# Patient Record
Sex: FEMALE | Race: WHITE | NOT HISPANIC OR LATINO | Employment: FULL TIME | URBAN - NONMETROPOLITAN AREA
[De-identification: names, ages, dates, MRNs, and addresses within clinical notes are randomized per-mention and may not be internally consistent; named-entity substitution may affect disease eponyms.]

---

## 2017-02-10 RX ORDER — LIDOCAINE 5 G/100G
CREAM RECTAL; TOPICAL
Qty: 1 TUBE | Refills: 0 | Status: SHIPPED | OUTPATIENT
Start: 2017-02-10 | End: 2017-09-28

## 2017-02-10 RX ORDER — LIDOCAINE 5 G/100G
CREAM RECTAL; TOPICAL
Refills: 0 | OUTPATIENT
Start: 2017-02-10

## 2017-09-28 ENCOUNTER — OFFICE VISIT (OUTPATIENT)
Dept: MEDICAL GROUP | Facility: CLINIC | Age: 47
End: 2017-09-28
Payer: COMMERCIAL

## 2017-09-28 ENCOUNTER — HOSPITAL ENCOUNTER (OUTPATIENT)
Facility: MEDICAL CENTER | Age: 47
End: 2017-09-28
Attending: PHYSICIAN ASSISTANT
Payer: COMMERCIAL

## 2017-09-28 VITALS
HEART RATE: 72 BPM | OXYGEN SATURATION: 98 % | SYSTOLIC BLOOD PRESSURE: 98 MMHG | RESPIRATION RATE: 14 BRPM | TEMPERATURE: 99.2 F | HEIGHT: 63 IN | DIASTOLIC BLOOD PRESSURE: 62 MMHG | BODY MASS INDEX: 20.38 KG/M2 | WEIGHT: 115 LBS

## 2017-09-28 DIAGNOSIS — Z13.6 SCREENING FOR CARDIOVASCULAR CONDITION: ICD-10-CM

## 2017-09-28 DIAGNOSIS — Z12.31 VISIT FOR SCREENING MAMMOGRAM: ICD-10-CM

## 2017-09-28 DIAGNOSIS — Z00.00 WELL ADULT EXAM: ICD-10-CM

## 2017-09-28 DIAGNOSIS — G89.29 CHRONIC UPPER BACK PAIN: ICD-10-CM

## 2017-09-28 DIAGNOSIS — E03.9 HYPOTHYROIDISM, UNSPECIFIED TYPE: ICD-10-CM

## 2017-09-28 DIAGNOSIS — M54.9 CHRONIC UPPER BACK PAIN: ICD-10-CM

## 2017-09-28 LAB — GLUCOSE BLD-MCNC: 70 MG/DL (ref 70–100)

## 2017-09-28 PROCEDURE — 82962 GLUCOSE BLOOD TEST: CPT | Performed by: PHYSICIAN ASSISTANT

## 2017-09-28 PROCEDURE — 99214 OFFICE O/P EST MOD 30 MIN: CPT | Performed by: PHYSICIAN ASSISTANT

## 2017-09-28 PROCEDURE — 80061 LIPID PANEL: CPT

## 2017-09-28 RX ORDER — LEVOTHYROXINE SODIUM 0.05 MG/1
50 TABLET ORAL
Qty: 30 TAB | Refills: 2 | Status: SHIPPED | OUTPATIENT
Start: 2017-09-28 | End: 2017-09-29 | Stop reason: SDUPTHER

## 2017-09-28 RX ORDER — MELOXICAM 7.5 MG/1
7.5 TABLET ORAL DAILY
Qty: 30 TAB | Refills: 0 | Status: SHIPPED | OUTPATIENT
Start: 2017-09-28 | End: 2017-11-09

## 2017-09-28 ASSESSMENT — PATIENT HEALTH QUESTIONNAIRE - PHQ9: CLINICAL INTERPRETATION OF PHQ2 SCORE: 0

## 2017-09-28 NOTE — ASSESSMENT & PLAN NOTE
Patient states this has been present for months and is not getting better. She has tried rubbing icy hot, bengay and other topical solutions without relief. She states tylenol and ibuprofen give her relief for a few hours but the pain returns. She has not tried massage and she states she stretches frequently at work. She has no numbness or tingling in her arms.

## 2017-09-28 NOTE — PROGRESS NOTES
Chief Complaint   Patient presents with   • Establish Care       HISTORY OF PRESENT ILLNESS: Patient is a 47 y.o. female established patient who presents today for evaluation and management of:    Chronic upper back pain  Patient states this has been present for months and is not getting better. She has tried rubbing icy hot, bengay and other topical solutions without relief. She states tylenol and ibuprofen give her relief for a few hours but the pain returns. She has not tried massage and she states she stretches frequently at work. She has no numbness or tingling in her arms.     Hypothyroid  Patient states she started taking the levothyroxine that was prescribed and it gave her nausea after a wek so she stopped and never had it rechecked. She states she wasn't sure why she was taking it to begin with. She does feel lethargic and her skin is quite dry but she does not feel poorly otherwise.     Well adult exam  Patient presents for a health screening for her work feeling well today with the exceptions listed.        Patient Active Problem List    Diagnosis Date Noted   • Hypothyroid 11/06/2016     Priority: High   • Weight loss 11/03/2016     Priority: High   • Tooth abscess 11/03/2016     Priority: High   • Left sided sciatica 03/03/2011     Priority: Medium   • Contact dermatitis 09/02/2016     Priority: Low   • Eczema 05/22/2015     Priority: Low   • Chronic upper back pain 09/28/2017   • Well adult exam 09/28/2017       Allergies:Review of patient's allergies indicates no known allergies.    Current Outpatient Prescriptions   Medication Sig Dispense Refill   • levothyroxine (SYNTHROID) 50 MCG Tab Take 1 Tab by mouth Every morning on an empty stomach. 30 Tab 2   • meloxicam (MOBIC) 7.5 MG Tab Take 1 Tab by mouth every day. 30 Tab 0     No current facility-administered medications for this visit.        Social History   Substance Use Topics   • Smoking status: Former Smoker     Packs/day: 0.50     Years: 26.00  "    Types: Cigarettes     Quit date: 2009   • Smokeless tobacco: Never Used   • Alcohol use No       Family Status   Relation Status   • Mother  at age 57    emphysema  and cirrhosis   • Father     62 in    • Brother  at age 47    complications from diabetes     Family History   Problem Relation Age of Onset   • Lung Disease Mother    • GI Mother    • Non-contributory Father      health history is unknown   • Diabetes Brother        Review of Systems:   Constitutional: Negative for fever, chills, weight loss and malaise.   HENT: Negative for ear pain, nosebleeds, congestion, sore throat and neck pain.    Eyes: Negative for blurred vision.   Respiratory: Negative for cough, sputum production, shortness of breath and wheezing.    Cardiovascular: Negative for chest pain, palpitations, orthopnea and leg swelling.   Gastrointestinal: Negative for heartburn, nausea, vomiting and abdominal pain.   Genitourinary: Negative for dysuria, urgency and frequency.   Musculoskeletal: See history of present illness above. Negative for joint pain.   Skin: Negative for rash and itching.   Neurological: Negative for dizziness, tingling, tremors, sensory change, focal weakness and headaches.   Endo/Heme/Allergies: Does not bruise/bleed easily.   Psychiatric/Behavioral: Negative for depression, suicidal ideas and memory loss.  The patient is not nervous/anxious and does not have insomnia.      Exam:  Blood pressure (!) 98/62, pulse 72, temperature 37.3 °C (99.2 °F), resp. rate 14, height 1.607 m (5' 3.25\"), weight 52.2 kg (115 lb), SpO2 98 %.  Body mass index is 20.21 kg/m².  General:  Healthy-Appearing female in NAD  Head: is grossly normal.  Neck: Supple without masses. Thyroid is not visibly enlarged.  Pulmonary: Clear to ausculation. Normal effort. No rales, ronchi, or wheezing.  Cardiovascular: Regular rate and rhythm without murmur. Carotid and radial pulses are intact and equal " bilaterally.  Extremities: no clubbing, cyanosis, or edema.    Medical decision-making and discussion:  1. Hypothyroidism, unspecified type  Patient was advised to take her levothyroxine one half tablet for one week followed by one full tablet thereafter. She is advised to take this medication on an empty stomach about 30-40 minutes before any other pills or food. She is advised that it may take 4-6 weeks for this medication to begin having an effect and she should try for at least that long before dismissing its positive effects.  - levothyroxine (SYNTHROID) 50 MCG Tab; Take 1 Tab by mouth Every morning on an empty stomach.  Dispense: 30 Tab; Refill: 2  - TSH+FREE T4    2. Well adult exam  Patient's waist circumference is 28 inches today. She has a healthy BMI and her blood glucose is within normal limits.  - POCT Glucose  - LIPID PANEL    3. Screening for cardiovascular condition  - POCT Glucose  - LIPID PANEL    4. Chronic upper back pain  Patient was advised to continue using hot packs and massage as needed. She is advised to take the following medication always with food. She can take one pill every morning and if this doesn't work after 3-4 days, she can take 2 pills every morning. If this is still not providing relief after 3-4 days, she is advised to stop taking this medication and return for follow-up visit.  - meloxicam (MOBIC) 7.5 MG Tab; Take 1 Tab by mouth every day.  Dispense: 30 Tab; Refill: 0    5. Visit for screening mammogram  - MA-SCREEN MAMMO W/CAD-BILAT  Patient states she has not had a mammogram in approximately 2 years.    Regarding health maintenance: Patient is refusing all vaccinations today including tetanus shot and influenza.    Please note that this dictation was created using voice recognition software. I have made every reasonable attempt to correct obvious errors, but I expect that there are errors of grammar and possibly content that I did not discover before finalizing the  note.      Return for female annual and 6 weeks follow up thyroid.

## 2017-09-28 NOTE — ASSESSMENT & PLAN NOTE
Patient presents for a health screening for her work feeling well today with the exceptions listed.

## 2017-09-28 NOTE — ASSESSMENT & PLAN NOTE
Patient states she started taking the levothyroxine that was prescribed and it gave her nausea after a wek so she stopped and never had it rechecked. She states she wasn't sure why she was taking it to begin with. She does feel lethargic and her skin is quite dry but she does not feel poorly otherwise.

## 2017-09-29 DIAGNOSIS — E03.9 HYPOTHYROIDISM, UNSPECIFIED TYPE: ICD-10-CM

## 2017-09-29 LAB
CHOLEST SERPL-MCNC: 190 MG/DL (ref 100–199)
HDLC SERPL-MCNC: 73 MG/DL
LDLC SERPL CALC-MCNC: 104 MG/DL
TRIGL SERPL-MCNC: 65 MG/DL (ref 0–149)

## 2017-09-29 NOTE — TELEPHONE ENCOUNTER
This was sent to the wrong pharm - please authorize it to go to [facility]    Was the patient seen in the last year in this department? Yes     Does patient have an active prescription for medications requested? No     Received Request Via: Patient

## 2017-10-02 RX ORDER — LEVOTHYROXINE SODIUM 0.05 MG/1
50 TABLET ORAL
Qty: 30 TAB | Refills: 0 | Status: SHIPPED | OUTPATIENT
Start: 2017-10-02 | End: 2018-04-05 | Stop reason: SDUPTHER

## 2017-10-02 NOTE — TELEPHONE ENCOUNTER
Patient's thyroid labs were not drawn when her other labs were drawn. Please have her return for this in order to receive more refills as her thyroid levels were not wnl at last check in Now 2016.

## 2017-10-20 ENCOUNTER — TELEPHONE (OUTPATIENT)
Dept: MEDICAL GROUP | Facility: CLINIC | Age: 47
End: 2017-10-20

## 2017-10-20 NOTE — TELEPHONE ENCOUNTER
----- Message from Rachna Shannon P.A.-C. sent at 10/2/2017  1:57 PM PDT -----  I reviewed labs. Everything looks good. Please return to have Thyroid labs drawn. Apologies for not having drawn this at the last lab draw.

## 2017-11-09 ENCOUNTER — OFFICE VISIT (OUTPATIENT)
Dept: MEDICAL GROUP | Facility: CLINIC | Age: 47
End: 2017-11-09
Payer: COMMERCIAL

## 2017-11-09 VITALS
DIASTOLIC BLOOD PRESSURE: 72 MMHG | TEMPERATURE: 97.3 F | HEIGHT: 63 IN | SYSTOLIC BLOOD PRESSURE: 114 MMHG | WEIGHT: 115 LBS | BODY MASS INDEX: 20.38 KG/M2 | OXYGEN SATURATION: 96 % | HEART RATE: 60 BPM

## 2017-11-09 DIAGNOSIS — Z23 NEED FOR VACCINATION: ICD-10-CM

## 2017-11-09 DIAGNOSIS — G89.29 CHRONIC UPPER BACK PAIN: ICD-10-CM

## 2017-11-09 DIAGNOSIS — M54.9 CHRONIC UPPER BACK PAIN: ICD-10-CM

## 2017-11-09 PROBLEM — Z00.00 WELL ADULT EXAM: Status: RESOLVED | Noted: 2017-09-28 | Resolved: 2017-11-09

## 2017-11-09 PROCEDURE — 99212 OFFICE O/P EST SF 10 MIN: CPT | Mod: 25 | Performed by: PHYSICIAN ASSISTANT

## 2017-11-09 PROCEDURE — 90471 IMMUNIZATION ADMIN: CPT | Performed by: PHYSICIAN ASSISTANT

## 2017-11-09 PROCEDURE — 90686 IIV4 VACC NO PRSV 0.5 ML IM: CPT | Performed by: PHYSICIAN ASSISTANT

## 2017-11-09 RX ORDER — MELOXICAM 15 MG/1
15 TABLET ORAL DAILY
Qty: 30 TAB | Refills: 2 | Status: SHIPPED | OUTPATIENT
Start: 2017-11-09 | End: 2018-07-05

## 2017-11-09 NOTE — ASSESSMENT & PLAN NOTE
Patient states this has been present for months and is getting better with use of once daily 7.5mg meloxicam although is still problematic. She has tried rubbing icy hot, bengay and other topical solutions with mild relief. She states tylenol and ibuprofen give her relief for a few hours but the pain returns. She has not tried massage and she states she stretches frequently at work. She has no numbness or tingling in her arms.

## 2017-11-09 NOTE — PROGRESS NOTES
Chief Complaint   Patient presents with   • Back Pain     medication not working   • Orders Needed     mammo       HISTORY OF PRESENT ILLNESS: Patient is a 47 y.o. female established patient who presents today for evaluation and management of:    Chronic upper back pain  Patient states this has been present for months and is getting better with use of once daily 7.5mg meloxicam although is still problematic. She has tried rubbing icy hot, bengay and other topical solutions with mild relief. She states tylenol and ibuprofen give her relief for a few hours but the pain returns. She has not tried massage and she states she stretches frequently at work. She has no numbness or tingling in her arms.         Patient Active Problem List    Diagnosis Date Noted   • Hypothyroid 2016     Priority: High   • Left sided sciatica 2011     Priority: Medium   • Eczema 2015     Priority: Low   • Chronic upper back pain 2017       Allergies:Review of patient's allergies indicates no known allergies.    Current Outpatient Prescriptions   Medication Sig Dispense Refill   • meloxicam (MOBIC) 15 MG tablet Take 1 Tab by mouth every day. 30 Tab 2   • levothyroxine (SYNTHROID) 50 MCG Tab Take 1 Tab by mouth Every morning on an empty stomach. 30 Tab 0     No current facility-administered medications for this visit.        Social History   Substance Use Topics   • Smoking status: Former Smoker     Packs/day: 0.50     Years: 26.00     Types: Cigarettes     Quit date: 2009   • Smokeless tobacco: Never Used   • Alcohol use No       Family Status   Relation Status   • Mother  at age 57    emphysema  and cirrhosis   • Father     62 in    • Brother  at age 47    complications from diabetes     Family History   Problem Relation Age of Onset   • Lung Disease Mother    • GI Mother    • Non-contributory Father      health history is unknown   • Diabetes Brother        Review of Systems:  "  Constitutional: Negative for fever, chills, weight loss and malaise/fatigue.   Respiratory: Negative for cough, shortness of breath .    Cardiovascular: Negative for chest pain   Gastrointestinal: Negative for heartburn and abdominal pain.   Genitourinary: Negative for dysuria, urgency and frequency.   Musculoskeletal: Positive for myalgias, back pain.   Neurological: Positive for some lightheadedness with meloxicam use, changed to night dosing and feels better. Negative for dizziness and headaches.   Endo/Heme/Allergies: Does not bruise/bleed easily.     Exam:  Blood pressure 114/72, pulse 60, temperature 36.3 °C (97.3 °F), height 1.6 m (5' 3\"), weight 52.2 kg (115 lb), SpO2 96 %.  Body mass index is 20.37 kg/m².  General:  Healthy-Appearing female in NAD  Head: is grossly normal.  Neck: Supple without masses. Thyroid is not visibly enlarged.  Pulmonary:  Normal effort.   Cardiovascular: Radial pulses are intact and equal bilaterally.  Extremities: no clubbing, cyanosis, or edema.    Medical decision-making and discussion:  1. Chronic upper back pain  Advised that if the increased dose of meloxicam does not alleviate her neck pain to a tolerable level, we will try voltaren tablets as our next step and potentially moxe on to celecoxib if needed.   - meloxicam (MOBIC) 15 MG tablet; Take 1 Tab by mouth every day.  Dispense: 30 Tab; Refill: 2    2. Need for vaccination    - Flu Quad Inj >3 Year Pre-Filled PF      Please note that this dictation was created using voice recognition software. I have made every reasonable attempt to correct obvious errors, but I expect that there are errors of grammar and possibly content that I did not discover before finalizing the note.      Return if symptoms worsen or fail to improve.  "

## 2017-11-16 ENCOUNTER — NON-PROVIDER VISIT (OUTPATIENT)
Dept: MEDICAL GROUP | Facility: CLINIC | Age: 47
End: 2017-11-16
Payer: COMMERCIAL

## 2017-11-16 ENCOUNTER — HOSPITAL ENCOUNTER (OUTPATIENT)
Facility: MEDICAL CENTER | Age: 47
End: 2017-11-16
Attending: PHYSICIAN ASSISTANT

## 2017-11-16 DIAGNOSIS — Z01.89 ROUTINE LAB DRAW: ICD-10-CM

## 2017-11-16 PROCEDURE — 99000 SPECIMEN HANDLING OFFICE-LAB: CPT | Performed by: PHYSICIAN ASSISTANT

## 2017-11-16 PROCEDURE — 36415 COLL VENOUS BLD VENIPUNCTURE: CPT | Performed by: PHYSICIAN ASSISTANT

## 2017-11-16 PROCEDURE — 84439 ASSAY OF FREE THYROXINE: CPT

## 2017-11-16 PROCEDURE — 84443 ASSAY THYROID STIM HORMONE: CPT

## 2017-11-17 LAB
T4 FREE SERPL-MCNC: 0.61 NG/DL (ref 0.53–1.43)
TSH SERPL DL<=0.005 MIU/L-ACNC: 42.05 UIU/ML (ref 0.3–3.7)

## 2017-11-20 DIAGNOSIS — E03.9 HYPOTHYROIDISM, UNSPECIFIED TYPE: ICD-10-CM

## 2017-11-20 NOTE — PROGRESS NOTES
I reviewed thyroid labs. I would like to have the patient return for follow up to discuss in mid December after she has repeat labs for this to see if her levothyroxine is working. New lab orders are in her chart to have this completed.

## 2017-11-25 ENCOUNTER — TELEPHONE (OUTPATIENT)
Dept: MEDICAL GROUP | Facility: CLINIC | Age: 47
End: 2017-11-25

## 2018-04-05 ENCOUNTER — OFFICE VISIT (OUTPATIENT)
Dept: MEDICAL GROUP | Facility: PHYSICIAN GROUP | Age: 48
End: 2018-04-05
Payer: COMMERCIAL

## 2018-04-05 VITALS
RESPIRATION RATE: 16 BRPM | TEMPERATURE: 97.8 F | OXYGEN SATURATION: 98 % | SYSTOLIC BLOOD PRESSURE: 98 MMHG | HEIGHT: 63 IN | BODY MASS INDEX: 20.38 KG/M2 | DIASTOLIC BLOOD PRESSURE: 72 MMHG | WEIGHT: 115 LBS | HEART RATE: 74 BPM

## 2018-04-05 DIAGNOSIS — M25.531 PAIN IN BOTH WRISTS: ICD-10-CM

## 2018-04-05 DIAGNOSIS — M79.601 PAIN IN BOTH UPPER EXTREMITIES: ICD-10-CM

## 2018-04-05 DIAGNOSIS — M54.9 CHRONIC UPPER BACK PAIN: ICD-10-CM

## 2018-04-05 DIAGNOSIS — E03.9 HYPOTHYROIDISM, UNSPECIFIED TYPE: ICD-10-CM

## 2018-04-05 DIAGNOSIS — M25.532 PAIN IN BOTH WRISTS: ICD-10-CM

## 2018-04-05 DIAGNOSIS — Z13.6 SCREENING FOR CARDIOVASCULAR CONDITION: ICD-10-CM

## 2018-04-05 DIAGNOSIS — G89.29 CHRONIC UPPER BACK PAIN: ICD-10-CM

## 2018-04-05 DIAGNOSIS — M79.602 PAIN IN BOTH UPPER EXTREMITIES: ICD-10-CM

## 2018-04-05 PROCEDURE — 99214 OFFICE O/P EST MOD 30 MIN: CPT | Performed by: NURSE PRACTITIONER

## 2018-04-05 RX ORDER — LEVOTHYROXINE SODIUM 0.05 MG/1
50 TABLET ORAL
Qty: 90 TAB | Refills: 0 | Status: SHIPPED | OUTPATIENT
Start: 2018-04-05 | End: 2018-04-09 | Stop reason: SDUPTHER

## 2018-04-05 RX ORDER — CELECOXIB 200 MG/1
200 CAPSULE ORAL 2 TIMES DAILY
Qty: 180 CAP | Refills: 1 | Status: SHIPPED | OUTPATIENT
Start: 2018-04-05 | End: 2018-07-05

## 2018-04-05 NOTE — PROGRESS NOTES
Chief Complaint   Patient presents with   • Hypothyroidism     est care, refill levothyroxine         This is a 47 y.o.female patient that presents today with the following: med refill    Chronic upper back pain  Pt has chronic pain in her upper back, shoulders, and bilateral upper extremities. This has been going on for the last year. She feels the pain mostly in the shoulders that radiates into her fingers. This is an aching pain, but has numbness in her arms and hands during the night. She has never been diagnosed with carpal tunnel syndrome.   She was taking Meloxicam 15 mg daily, but did not work at all. She did have positive Tinel's and Phalen's in office. She has not tried braces at night. Will get neurodiagnostics, and depending on results, may need referral to hand surgeon. In the interim, she was advised to wear braces at night, continue with NSAIDS.    Hypothyroid  Patient reports she has been taking levothyroxine 50 µg for her hypothyroidism, but she has been out and needs a refill. The last time she had labs her TSH was over 40. We'll like her to repeat these labs, these were ordered. Her medications have been refilled, but I told her we will likely be changing this dose. She does tolerate medications well with no significant bothersome side effects. She does understand to take this on an empty stomach first thing in the morning apart from her other medications.    Pain in both wrists  See additional notes on chronic upper back pain. She also has chronic upper extremity and wrist pain. She did have positive Tinel's and Phalen test and office today. We'll get neurodiagnostic including EMG and nerve conduction study to test for carpal tunnel syndrome. She was given bilateral cough up wrist splints to wear at night. We'll have her stop meloxicam and start Celebrex 200 mg twice daily. She was advised to take this with food. Also advised her to try vitamin B6 for numbness and tingling. She is been a  "follow-up with me sometime after her nerve conduction study. I did discuss her she may need referral to hand surgeon depending on the results.      No visits with results within 1 Month(s) from this visit.   Latest known visit with results is:   Hospital Outpatient Visit on 11/16/2017   Component Date Value   • TSH 11/16/2017 42.050*   • Free T-4 11/16/2017 0.61          clinical course has been stable    Past Medical History:   Diagnosis Date   • Left sided sciatica 3/3/2011   • Urinary frequency 8/20/2010       Past Surgical History:   Procedure Laterality Date   • TUBAL LIGATION         Family History   Problem Relation Age of Onset   • Lung Disease Mother    • GI Mother    • Non-contributory Father      health history is unknown   • Diabetes Brother        Patient has no known allergies.    Current Outpatient Prescriptions Ordered in [facility]   Medication Sig Dispense Refill   • levothyroxine (SYNTHROID) 50 MCG Tab Take 1 Tab by mouth Every morning on an empty stomach. 90 Tab 0   • celecoxib (CELEBREX) 200 MG Cap Take 1 Cap by mouth 2 times a day. 180 Cap 1   • meloxicam (MOBIC) 15 MG tablet Take 1 Tab by mouth every day. 30 Tab 2     No current Epic-ordered facility-administered medications on file.        Constitutional ROS: No unexpected change in weight, No weakness, No unexplained fevers, sweats, or chills  Pulmonary ROS: No chronic cough, sputum, or hemoptysis, No shortness of breath, No recent change in breathing  Cardiovascular ROS: No chest pain, No edema, No palpitations  Gastrointestinal ROS: No abdominal pain, No nausea, vomiting, diarrhea, or constipation  Musculoskeletal/Extremities ROS: positive per HPI  Neurologic ROS: Normal development, No seizures, No weakness, Positive for numbness and tingling to BUE  Endocrine ROS: positive per HPI    Physical exam:  BP (!) 98/72   Pulse 74   Temp 36.6 °C (97.8 °F)   Resp 16   Ht 1.6 m (5' 3\")   Wt 52.2 kg (115 lb)   SpO2 98%   BMI 20.37 kg/m²   General " Appearance: thin, middle-aged female, alert, no distress, well nourished, well-groomed  Skin: Skin color, texture, turgor normal. No rashes or lesions.  Eyes: conjunctivae/corneas clear. PERRL, EOM's intact. Fundi benign  Lungs: negative findings: normal respiratory rate and rhythm, lungs clear to auscultation  Heart: negative. RRR without murmur, gallop, or rubs.  No ectopy.  Abdomen: Abdomen soft, non-tender. BS normal. No masses,  No organomegaly  Extremities: positive findings: +Tinel's and Phalen's test  Musculoskeletal: negative findings: strength normal, no evidence of muscle atrophy, no deformities present  Neurologic: intact, oriented, mood appropriate, judgement intact, cranial nerves 2-12 grossly intact.    Medical decision making/discussion: Patient is going to stop meloxicam and start Celebrex. She was given bilateral wrist splints to wear at night. We will get neurodiagnostic studies to assess for carpal tunnel due to positive Tinel's and Phalen test in office today. She is going to have labs done sometime tomorrow or Saturday. She is to follow-up with me in 3 months, sooner if needed. Levothyroxine has been refilled, however told her we will likely have to change the dose as her last TSH was elevated and she did not follow-up for a repeat TSH.    Bertha was seen today for hypothyroidism.    Diagnoses and all orders for this visit:    Hypothyroidism, unspecified type  -     TSH WITH REFLEX TO FT4; Future  -     levothyroxine (SYNTHROID) 50 MCG Tab; Take 1 Tab by mouth Every morning on an empty stomach.    Chronic upper back pain    Pain in both upper extremities  -     celecoxib (CELEBREX) 200 MG Cap; Take 1 Cap by mouth 2 times a day.  -     REFERRAL TO NEURODIAGNOSTICS (EEG,EP,EMG/NCS/DBS) Modality Requested: EMG/NCS-Comment Extremities    Pain in both wrists  -     celecoxib (CELEBREX) 200 MG Cap; Take 1 Cap by mouth 2 times a day.  -     REFERRAL TO NEURODIAGNOSTICS (EEG,EP,EMG/NCS/DBS) Modality  Requested: EMG/NCS-Comment Extremities    Screening for cardiovascular condition  -     COMP METABOLIC PANEL; Future  -     LIPID PROFILE; Future          Please note that this dictation was created using voice recognition software. I have made every reasonable attempt to correct obvious errors, but I expect that there are errors of grammar and possibly content that I did not discover before finalizing the note.

## 2018-04-05 NOTE — ASSESSMENT & PLAN NOTE
See additional notes on chronic upper back pain. She also has chronic upper extremity and wrist pain. She did have positive Tinel's and Phalen test and office today. We'll get neurodiagnostic including EMG and nerve conduction study to test for carpal tunnel syndrome. She was given bilateral cough up wrist splints to wear at night. We'll have her stop meloxicam and start Celebrex 200 mg twice daily. She was advised to take this with food. Also advised her to try vitamin B6 for numbness and tingling. She is been a follow-up with me sometime after her nerve conduction study. I did discuss her she may need referral to hand surgeon depending on the results.

## 2018-04-05 NOTE — PATIENT INSTRUCTIONS
Labs tomorrow or Saturday    Levothyroxine called in    Stop the Meloxicam and  Start Celebrex, also try vitamin B6    Wrist braces at night    Neuro diagnostic studies

## 2018-04-05 NOTE — ASSESSMENT & PLAN NOTE
Patient reports she has been taking levothyroxine 50 µg for her hypothyroidism, but she has been out and needs a refill. The last time she had labs her TSH was over 40. We'll like her to repeat these labs, these were ordered. Her medications have been refilled, but I told her we will likely be changing this dose. She does tolerate medications well with no significant bothersome side effects. She does understand to take this on an empty stomach first thing in the morning apart from her other medications.

## 2018-04-05 NOTE — ASSESSMENT & PLAN NOTE
Pt has chronic pain in her upper back, shoulders, and bilateral upper extremities. This has been going on for the last year. She feels the pain mostly in the shoulders that radiates into her fingers. This is an aching pain, but has numbness in her arms and hands during the night. She has never been diagnosed with carpal tunnel syndrome.   She was taking Meloxicam 15 mg daily, but did not work at all. She did have positive Tinel's and Phalen's in office. She has not tried braces at night. Will get neurodiagnostics, and depending on results, may need referral to hand surgeon. In the interim, she was advised to wear braces at night, continue with NSAIDS.

## 2018-04-07 ENCOUNTER — HOSPITAL ENCOUNTER (OUTPATIENT)
Dept: LAB | Facility: MEDICAL CENTER | Age: 48
End: 2018-04-07
Attending: NURSE PRACTITIONER
Payer: COMMERCIAL

## 2018-04-07 DIAGNOSIS — Z13.6 SCREENING FOR CARDIOVASCULAR CONDITION: ICD-10-CM

## 2018-04-07 DIAGNOSIS — E03.9 HYPOTHYROIDISM, UNSPECIFIED TYPE: ICD-10-CM

## 2018-04-07 LAB
ALBUMIN SERPL BCP-MCNC: 4.2 G/DL (ref 3.2–4.9)
ALBUMIN/GLOB SERPL: 1.6 G/DL
ALP SERPL-CCNC: 27 U/L (ref 30–99)
ALT SERPL-CCNC: 14 U/L (ref 2–50)
ANION GAP SERPL CALC-SCNC: 3 MMOL/L (ref 0–11.9)
AST SERPL-CCNC: 18 U/L (ref 12–45)
BILIRUB SERPL-MCNC: 2.1 MG/DL (ref 0.1–1.5)
BUN SERPL-MCNC: 11 MG/DL (ref 8–22)
CALCIUM SERPL-MCNC: 9.3 MG/DL (ref 8.5–10.5)
CHLORIDE SERPL-SCNC: 106 MMOL/L (ref 96–112)
CHOLEST SERPL-MCNC: 161 MG/DL (ref 100–199)
CO2 SERPL-SCNC: 30 MMOL/L (ref 20–33)
CREAT SERPL-MCNC: 0.5 MG/DL (ref 0.5–1.4)
GLOBULIN SER CALC-MCNC: 2.7 G/DL (ref 1.9–3.5)
GLUCOSE SERPL-MCNC: 79 MG/DL (ref 65–99)
HDLC SERPL-MCNC: 64 MG/DL
LDLC SERPL CALC-MCNC: 83 MG/DL
POTASSIUM SERPL-SCNC: 4.1 MMOL/L (ref 3.6–5.5)
PROT SERPL-MCNC: 6.9 G/DL (ref 6–8.2)
SODIUM SERPL-SCNC: 139 MMOL/L (ref 135–145)
T4 FREE SERPL-MCNC: 0.44 NG/DL (ref 0.53–1.43)
TRIGL SERPL-MCNC: 70 MG/DL (ref 0–149)
TSH SERPL DL<=0.005 MIU/L-ACNC: 37.66 UIU/ML (ref 0.38–5.33)

## 2018-04-07 PROCEDURE — 84443 ASSAY THYROID STIM HORMONE: CPT

## 2018-04-07 PROCEDURE — 80053 COMPREHEN METABOLIC PANEL: CPT

## 2018-04-07 PROCEDURE — 36415 COLL VENOUS BLD VENIPUNCTURE: CPT

## 2018-04-07 PROCEDURE — 80061 LIPID PANEL: CPT

## 2018-04-07 PROCEDURE — 84439 ASSAY OF FREE THYROXINE: CPT

## 2018-04-09 DIAGNOSIS — E03.9 HYPOTHYROIDISM, UNSPECIFIED TYPE: ICD-10-CM

## 2018-04-09 RX ORDER — LEVOTHYROXINE SODIUM 0.07 MG/1
75 TABLET ORAL
Qty: 90 TAB | Refills: 1 | Status: SHIPPED | OUTPATIENT
Start: 2018-04-09 | End: 2018-07-05 | Stop reason: SDUPTHER

## 2018-06-20 ENCOUNTER — HOSPITAL ENCOUNTER (OUTPATIENT)
Dept: LAB | Facility: MEDICAL CENTER | Age: 48
End: 2018-06-20
Attending: NURSE PRACTITIONER
Payer: COMMERCIAL

## 2018-06-20 DIAGNOSIS — E03.9 HYPOTHYROIDISM, UNSPECIFIED TYPE: ICD-10-CM

## 2018-06-20 LAB
T4 FREE SERPL-MCNC: 0.77 NG/DL (ref 0.53–1.43)
TSH SERPL DL<=0.005 MIU/L-ACNC: 12.31 UIU/ML (ref 0.38–5.33)

## 2018-06-20 PROCEDURE — 84439 ASSAY OF FREE THYROXINE: CPT

## 2018-06-20 PROCEDURE — 84443 ASSAY THYROID STIM HORMONE: CPT

## 2018-06-20 PROCEDURE — 36415 COLL VENOUS BLD VENIPUNCTURE: CPT

## 2018-07-05 ENCOUNTER — OFFICE VISIT (OUTPATIENT)
Dept: MEDICAL GROUP | Facility: PHYSICIAN GROUP | Age: 48
End: 2018-07-05
Payer: COMMERCIAL

## 2018-07-05 VITALS
HEIGHT: 63 IN | WEIGHT: 111 LBS | HEART RATE: 76 BPM | RESPIRATION RATE: 12 BRPM | OXYGEN SATURATION: 99 % | TEMPERATURE: 98.5 F | BODY MASS INDEX: 19.67 KG/M2 | DIASTOLIC BLOOD PRESSURE: 72 MMHG | SYSTOLIC BLOOD PRESSURE: 100 MMHG

## 2018-07-05 DIAGNOSIS — M54.9 CHRONIC UPPER BACK PAIN: ICD-10-CM

## 2018-07-05 DIAGNOSIS — M79.602 PAIN IN BOTH UPPER EXTREMITIES: ICD-10-CM

## 2018-07-05 DIAGNOSIS — E03.9 HYPOTHYROIDISM, UNSPECIFIED TYPE: ICD-10-CM

## 2018-07-05 DIAGNOSIS — G89.29 CHRONIC UPPER BACK PAIN: ICD-10-CM

## 2018-07-05 DIAGNOSIS — M79.601 PAIN IN BOTH UPPER EXTREMITIES: ICD-10-CM

## 2018-07-05 DIAGNOSIS — M25.531 PAIN IN BOTH WRISTS: ICD-10-CM

## 2018-07-05 DIAGNOSIS — M25.532 PAIN IN BOTH WRISTS: ICD-10-CM

## 2018-07-05 DIAGNOSIS — F41.1 PRE-OPERATIVE ANXIETY: ICD-10-CM

## 2018-07-05 PROCEDURE — 99214 OFFICE O/P EST MOD 30 MIN: CPT | Performed by: NURSE PRACTITIONER

## 2018-07-05 RX ORDER — DIAZEPAM 5 MG/1
5 TABLET ORAL
Qty: 2 TAB | Refills: 0 | Status: SHIPPED | OUTPATIENT
Start: 2018-07-31 | End: 2018-08-01

## 2018-07-05 RX ORDER — LEVOTHYROXINE SODIUM 0.1 MG/1
100 TABLET ORAL
Qty: 90 TAB | Refills: 1 | Status: SHIPPED | OUTPATIENT
Start: 2018-07-05 | End: 2018-11-20 | Stop reason: SDUPTHER

## 2018-07-05 RX ORDER — TIZANIDINE 4 MG/1
4 TABLET ORAL 3 TIMES DAILY
Qty: 30 TAB | Refills: 1 | Status: SHIPPED | OUTPATIENT
Start: 2018-07-05 | End: 2018-11-20 | Stop reason: SDUPTHER

## 2018-07-05 NOTE — PROGRESS NOTES
Chief Complaint   Patient presents with   • Labs Only     follow up   • Hypothyroidism         This is a 47 y.o.female patient that presents today with the following: Follow-up visit, review labsclinical course has been stable    Chronic upper back pain  Patient continues to have pain in her upper back, shoulders and bilateral upper extremities.  At her last visit with me in early April she was referred to have neurodiagnostic studies, but she did not hear from the radiology department to set this up.  She was given information on how to set up appointment for this procedure.  She was given prescription for Celebrex, but after talking to her pharmacist about the black box warning she decided not to take it.  I discussed with her that this is rare and that she should be okay to restart this in addition to muscle relaxer, new muscle relaxer was called in for her.  She was advised not to drive while taking this due to sedating effects.  She does have upcoming appointment for MRI, reports to having claustrophobia and is anxious about exam.  She was given a prescription for Valium 5 mg 1 pill 1-1/2 hours prior to procedure, may repeat in 1 hour if needed, #2.    Hypothyroid  This is a chronic condition, suboptimally controlled on current dose of levothyroxine.  She is currently on 75 mcg daily, but her most recent TSH was over 12.  We will increase this to 100 mcg daily and she is to follow-up with the lab in 6-8 weeks.  She does understand to take this on an empty stomach first thing in the morning apart from her other medications.    Pain in both wrists  See additional notes    Pain in both upper extremities  See additional notes      Hospital Outpatient Visit on 06/20/2018   Component Date Value   • TSH 06/20/2018 12.310*   • Free T-4 06/20/2018 0.77          clinical course has been stable    Past Medical History:   Diagnosis Date   • Left sided sciatica 3/3/2011   • Urinary frequency 8/20/2010       Past Surgical  "History:   Procedure Laterality Date   • TUBAL LIGATION         Family History   Problem Relation Age of Onset   • Lung Disease Mother    • GI Mother    • Non-contributory Father      health history is unknown   • Diabetes Brother        Patient has no known allergies.    Current Outpatient Prescriptions Ordered in [facility]   Medication Sig Dispense Refill   • levothyroxine (SYNTHROID) 100 MCG Tab Take 1 Tab by mouth Every morning on an empty stomach. 90 Tab 1   • tizanidine (ZANAFLEX) 4 MG Tab Take 1 Tab by mouth 3 times a day. 30 Tab 1   • [START ON 7/31/2018] diazePAM (VALIUM) 5 MG Tab Take 1 Tab by mouth One-time as needed for Anxiety for up to 1 dose. 2 Tab 0     No current Epic-ordered facility-administered medications on file.        Constitutional ROS: No unexpected change in weight, No weakness, No unexplained fevers, sweats, or chills  Pulmonary ROS: No chronic cough, sputum, or hemoptysis, No shortness of breath, No recent change in breathing  Cardiovascular ROS: No chest pain, No edema, No palpitations  Gastrointestinal ROS: No abdominal pain, No nausea, vomiting, diarrhea, or constipation  Musculoskeletal/Extremities ROS: Positive per HPI  Neurologic ROS: Normal development, No seizures, No weakness  Endocrine ROS: Positive per HPI    Physical exam:  /72   Pulse 76   Temp 36.9 °C (98.5 °F)   Resp 12   Ht 1.6 m (5' 3\")   Wt 50.3 kg (111 lb)   SpO2 99%   BMI 19.66 kg/m²   General Appearance: Middle-aged female, alert, no distress, well-nourished, well-groomed  Skin: Skin color, texture, turgor normal. No rashes or lesions.  Lungs: negative findings: normal respiratory rate and rhythm, lungs clear to auscultation  Musculoskeletal: positive findings: Decreased range of motion to bilateral upper extremities due to pain  Neurologic: intact, CN II through XII grossly intact    Medical decision making/discussion: Patient to reschedule neurodiagnostic studies including EMG/NCV.  She is to increase her " levothyroxine to 100 mcg daily and follow-up with lab in 6-8 weeks.  I would like her to follow-up with me again in 2-3 months.  In the interim I have asked her to restart the Celebrex and will add tizanidine.  She was informed of risks, benefits and side effects of both medication and warned not to drive while taking tizanidine.    Bertha was seen today for labs only and hypothyroidism.    Diagnoses and all orders for this visit:    Hypothyroidism, unspecified type  -     levothyroxine (SYNTHROID) 100 MCG Tab; Take 1 Tab by mouth Every morning on an empty stomach.  -     TSH WITH REFLEX TO FT4; Future    Pain in both upper extremities  -     tizanidine (ZANAFLEX) 4 MG Tab; Take 1 Tab by mouth 3 times a day.    Pain in both wrists  -     tizanidine (ZANAFLEX) 4 MG Tab; Take 1 Tab by mouth 3 times a day.    Chronic upper back pain  -     tizanidine (ZANAFLEX) 4 MG Tab; Take 1 Tab by mouth 3 times a day.    Pre-operative anxiety  -     diazePAM (VALIUM) 5 MG Tab; Take 1 Tab by mouth One-time as needed for Anxiety for up to 1 dose.          Please note that this dictation was created using voice recognition software. I have made every reasonable attempt to correct obvious errors, but I expect that there are errors of grammar and possibly content that I did not discover before finalizing the note.

## 2018-07-05 NOTE — ASSESSMENT & PLAN NOTE
This is a chronic condition, suboptimally controlled on current dose of levothyroxine.  She is currently on 75 mcg daily, but her most recent TSH was over 12.  We will increase this to 100 mcg daily and she is to follow-up with the lab in 6-8 weeks.  She does understand to take this on an empty stomach first thing in the morning apart from her other medications.

## 2018-07-05 NOTE — ASSESSMENT & PLAN NOTE
Patient continues to have pain in her upper back, shoulders and bilateral upper extremities.  At her last visit with me in early April she was referred to have neurodiagnostic studies, but she did not hear from the radiology department to set this up.  She was given information on how to set up appointment for this procedure.  She was given prescription for Celebrex, but after talking to her pharmacist about the black box warning she decided not to take it.  I discussed with her that this is rare and that she should be okay to restart this in addition to muscle relaxer, new muscle relaxer was called in for her.  She was advised not to drive while taking this due to sedating effects.  She does have upcoming appointment for MRI, reports to having claustrophobia and is anxious about exam.  She was given a prescription for Valium 5 mg 1 pill 1-1/2 hours prior to procedure, may repeat in 1 hour if needed, #2.

## 2018-07-31 ENCOUNTER — NON-PROVIDER VISIT (OUTPATIENT)
Dept: NEUROLOGY | Facility: MEDICAL CENTER | Age: 48
End: 2018-07-31
Payer: COMMERCIAL

## 2018-07-31 DIAGNOSIS — M25.531 RIGHT WRIST PAIN: ICD-10-CM

## 2018-07-31 DIAGNOSIS — M25.532 LEFT WRIST PAIN: ICD-10-CM

## 2018-07-31 DIAGNOSIS — G56.23 ULNAR NEUROPATHY OF BOTH UPPER EXTREMITIES: ICD-10-CM

## 2018-07-31 DIAGNOSIS — M79.601 RIGHT UPPER LIMB PAIN: ICD-10-CM

## 2018-07-31 PROCEDURE — 95886 MUSC TEST DONE W/N TEST COMP: CPT | Performed by: SPECIALIST

## 2018-07-31 PROCEDURE — 95910 NRV CNDJ TEST 7-8 STUDIES: CPT | Performed by: SPECIALIST

## 2018-07-31 NOTE — PROCEDURES
DATE OF SERVICE:  2018    EMG AND NERVE CONDUCTION STUDY    ORDERING PROVIDER:  SANKET Martell    DATE OF STUDY:  2018    Nerve conduction studies of the left upper extremity revealed the followin.  Left median motor distal latency, amplitude, and conduction velocity are   within normal limits.  2.  Left median sensory distal latency and amplitude are within normal limits.  3.  Left ulnar motor distal latency amplitude, and distal conduction are   within normal limits.  There is mild slowing of conduction across the elbow.  4.  Left ulnar sensory distal latency and amplitude are within normal limits.  5.  Left median F wave exhibits a normal distal latency.    Needle examination of selected muscles studied in the left upper extremity   reveals no evidence of acute or chronic denervation changes.  Muscles studied   were left deltoid, biceps, triceps, abductor pollicis brevis and first dorsal   interosseous.    Nerve conduction studies of the right upper extremity revealed the followin.  Right median motor distal latency, amplitude, and conduction velocity are   within normal limits.  2.  Right median sensory distal latency and amplitude are within normal   limits.  3.  Right ulnar motor distal latency, amplitude, and distal conduction are   within normal limits.  There is mild slowing of conduction across the elbow.  4.  Right ulnar sensory distal latency is mildly prolonged with a normal   amplitude waveform.  5.  Right median F wave exhibits a normal distal latency.    Needle examination of the selected muscles studied in the right upper   extremity reveals no evidence of acute or chronic denervation changes.    Muscles studied were right deltoid, biceps, triceps, abductor pollicis brevis   and first dorsal interosseous.    Nerve Conduction Studies     Stim Site NR Peak (ms) Norm Peak (ms) O-P Amp (µV) Norm O-P Amp Site1 Site2 Delta-P (ms) Dist (cm) Fracisco (m/s) Norm Fracisco (m/s)   Left  Median Anti Sensory (2nd Digit)   Wrist    3.2 <3.4 39.1 >20 Wrist 2nd Digit 3.2 14.0 *44 >50   Right Median Anti Sensory (2nd Digit)   Wrist    3.3 <3.4 39.9 >20 Wrist 2nd Digit 3.3 14.0 *42 >50   Left Ulnar Anti Sensory (5th Digit)   Wrist    2.9 <3.1 29.5 >12 Wrist 5th Digit 2.9 14.0 *48 >50   Right Ulnar Anti Sensory (5th Digit)   Wrist    *3.4 <3.1 42.1 >12 Wrist 5th Digit 3.4 14.0 *41 >50        Stim Site NR Onset (ms) Norm Onset (ms) O-P Amp (mV) Norm O-P Amp Site1 Site2 Delta-0 (ms) Dist (cm) Fracisco (m/s) Norm Fracisco (m/s)   Left Median Motor (Abd Poll Brev)   Wrist    3.7 <3.9 9.2 >6 Elbow Wrist 4.1 25.0 61 >50   Elbow    7.8  9.0          Right Median Motor (Abd Poll Brev)   Wrist    3.7 <3.9 8.4 >6 Elbow Wrist 4.3 23.0 53 >50   Elbow    8.0  8.4          Left Ulnar Motor (Abd Dig Min)   Wrist    2.5 <3.1 *6.6 >7 B Elbow Wrist 2.7 17.5 65 >50   B Elbow    5.2  4.8  A Elbow B Elbow 1.9 10.0 53    A Elbow    7.1  5.1          Right Ulnar Motor (Abd Dig Min)   Wrist    2.9 <3.1 8.0 >7 B Elbow Wrist 2.4 16.0 67 >50   B Elbow    5.3  7.5  A Elbow B Elbow 2.0 10.0 50    A Elbow    7.3  7.2            F Wave Studies     NR F-Lat (ms) Lat Norm (ms)   Left Median (Abd Poll Brev)      24.84 <31   Right Median (Abd Poll Brev)      29.06 <31                               Electromyography     Side Muscle Nerve Root Ins Act Fibs Psw Amp Dur Poly Recrt Int Pat Comment   Left Deltoid Axillary C5-6 Nml Nml Nml Nml Nml 0 Nml Nml    Left Biceps Musculocut C5-6 Nml Nml Nml Nml Nml 0 Nml Nml    Left Triceps Radial C6-7-8 Nml Nml Nml Nml Nml 0 Nml Nml    Left Abd Poll Brev Median C8-T1 Nml Nml Nml Nml Nml 0 Nml Nml    Left 1stDorInt Ulnar C8-T1 Nml Nml Nml Nml Nml 0 Nml Nml    Right Deltoid Axillary C5-6 Nml Nml Nml Nml Nml 0 Nml Nml    Right Biceps Musculocut C5-6 Nml Nml Nml Nml Nml 0 Nml Nml    Right Triceps Radial C6-7-8 Nml Nml Nml Nml Nml 0 Nml Nml    Right Abd Poll Brev Median C8-T1 Nml Nml Nml Nml Nml 0 Nml Nml    Right  1stDorInt Ulnar C8-T1 Nml Nml Nml Nml Nml 0 Nml Nml        IMPRESSION:  1.  Left ulnar nerve slowing across the elbow, which is mild   electrophysiologically and not associated with motor unit changes in ulnar   nerve supplied hand muscles.  2.  Right ulnar nerve slowing across the elbow, which is mild   electrophysiologically and not associated with motor unit changes in ulnar   nerve supplied hand muscles.  3.  Mild prolongation of the right ulnar sensory distal latency suggestive of   a mild ulnar neuropathy at the wrist.  4.  No evidence of radiculopathy of selected muscles studied, bilateral upper   extremities.       ____________________________________     G MD CARLOS SORIA / MARIE    DD:  07/31/2018 14:35:00  DT:  07/31/2018 14:45:17    D#:  [id number]  Job#:  [id number]

## 2018-10-09 ENCOUNTER — OFFICE VISIT (OUTPATIENT)
Dept: MEDICAL GROUP | Facility: PHYSICIAN GROUP | Age: 48
End: 2018-10-09
Payer: COMMERCIAL

## 2018-10-09 VITALS
HEART RATE: 68 BPM | BODY MASS INDEX: 20.38 KG/M2 | SYSTOLIC BLOOD PRESSURE: 110 MMHG | TEMPERATURE: 97.8 F | DIASTOLIC BLOOD PRESSURE: 66 MMHG | WEIGHT: 115 LBS | OXYGEN SATURATION: 100 % | HEIGHT: 63 IN | RESPIRATION RATE: 18 BRPM

## 2018-10-09 DIAGNOSIS — G89.29 CHRONIC UPPER BACK PAIN: ICD-10-CM

## 2018-10-09 DIAGNOSIS — M54.9 CHRONIC UPPER BACK PAIN: ICD-10-CM

## 2018-10-09 DIAGNOSIS — M25.531 PAIN IN BOTH WRISTS: ICD-10-CM

## 2018-10-09 DIAGNOSIS — M79.602 PAIN IN BOTH UPPER EXTREMITIES: ICD-10-CM

## 2018-10-09 DIAGNOSIS — M79.601 PAIN IN BOTH UPPER EXTREMITIES: ICD-10-CM

## 2018-10-09 DIAGNOSIS — E03.9 HYPOTHYROIDISM, UNSPECIFIED TYPE: ICD-10-CM

## 2018-10-09 DIAGNOSIS — M25.532 PAIN IN BOTH WRISTS: ICD-10-CM

## 2018-10-09 PROCEDURE — 99214 OFFICE O/P EST MOD 30 MIN: CPT | Performed by: NURSE PRACTITIONER

## 2018-10-09 NOTE — ASSESSMENT & PLAN NOTE
Pt here for follow up and discuss results of neurodiagnostic studies done to further assess bilateral UE pain, numbness and tingling. Test was normal except for mild ulnar neuropathy on the RUE. She continues to have BUE pain. She has been using bilateral cock-up wrist splints and taking OTC analgesic, but is not improving her pain. She is agreeable to referral to sports medicine.

## 2018-10-09 NOTE — PROGRESS NOTES
Chief Complaint   Patient presents with   • Medication Refill         This is a 48 y.o.female patient that presents today with the following: Medication refills, discussed labs    Pain in both wrists  Pt here for follow up and discuss results of neurodiagnostic studies done to further assess bilateral UE pain, numbness and tingling. Test was normal except for mild ulnar neuropathy on the RUE. She continues to have BUE pain. She has been using bilateral cock-up wrist splints and taking OTC analgesic, but is not improving her pain. She is agreeable to referral to sports medicine.     Chronic upper back pain  This is chronic, uncontrolled. This has been going on for several months now. She thought she had an order for thoracic spine MRI, but I believe we discussed that this would be ordered if her pain did not improve with exercises, Celebrex, heat and stretches. Pain persists. She has not been taking the Celebrex because of the black box warning on label. She is taking muscle relaxer, but this does not help much. Did advise PT, she would like to wait until she has MRI.    Hypothyroid  This is a chronic condition, status of control unknown as she was to have labs done before today's visit.  She admits to me that she has not been taking medications consistently and did not want to have labs checked at this time because she knows they will be abnormally high.  She is currently on 100 mcg daily, her last TSH was over 12.  She was strongly advised to take his medications consistently and we will recheck labs again in 6-8 weeks.  She tolerates this well with no significant or bothersome side effects and she does understand to take this on an empty stomach first thing in the morning apart from her other medications.      No visits with results within 1 Month(s) from this visit.   Latest known visit with results is:   Hospital Outpatient Visit on 06/20/2018   Component Date Value   • TSH 06/20/2018 12.310*   • Free T-4  "06/20/2018 0.77          clinical course has been stable    Past Medical History:   Diagnosis Date   • Left sided sciatica 3/3/2011   • Urinary frequency 8/20/2010       Past Surgical History:   Procedure Laterality Date   • TUBAL LIGATION         Family History   Problem Relation Age of Onset   • Lung Disease Mother    • GI Mother    • Non-contributory Father         health history is unknown   • Diabetes Brother        Patient has no known allergies.    Current Outpatient Prescriptions Ordered in [facility]   Medication Sig Dispense Refill   • levothyroxine (SYNTHROID) 100 MCG Tab Take 1 Tab by mouth Every morning on an empty stomach. 90 Tab 1   • tizanidine (ZANAFLEX) 4 MG Tab Take 1 Tab by mouth 3 times a day. 30 Tab 1     No current Epic-ordered facility-administered medications on file.        Constitutional ROS: No unexpected change in weight, No weakness, No unexplained fevers, sweats, or chills  Pulmonary ROS: No chronic cough, sputum, or hemoptysis, No shortness of breath, No recent change in breathing  Cardiovascular ROS: No chest pain  Musculoskeletal/Extremities ROS: Positive per HPI  Neurologic ROS: Normal development, No seizures, No weakness  Endocrine ROS: Positive per HPI    Physical exam:  /66 (BP Location: Right arm, Patient Position: Sitting)   Pulse 68   Temp 36.6 °C (97.8 °F) (Temporal)   Resp 18   Ht 1.6 m (5' 3\")   Wt 52.2 kg (115 lb)   LMP 10/01/2018 (Exact Date)   SpO2 100%   BMI 20.37 kg/m²   General Appearance: Middle-aged female, alert, no distress, well-nourished, well-groomed  Skin: Skin color, texture, turgor normal. No rashes or lesions.  Lungs: negative findings: normal respiratory rate and rhythm, normal effort  Extremities: positive findings: Mildly limited range of motion of thoracic spine and bilateral wrist due to pain  Neurologic: intact    Medical decision making/discussion: Patient has been referred to sports medicine for further evaluation and treatment of " bilateral upper extremity pain as well as her thoracic spine pain.  Will get MRI of her thoracic spine.  She was advised to continue wearing wrist splints especially at bedtime and she can try taking a vitamin B6 daily at 200 mg.  She is to follow-up with me in 6-8 weeks with labs done before visit.    Bertha was seen today for medication refill.    Diagnoses and all orders for this visit:    Pain in both wrists  -     REFERRAL TO SPORTS MEDICINE    Pain in both upper extremities  -     REFERRAL TO SPORTS MEDICINE    Chronic upper back pain  -     MR-THORACIC SPINE-W/O; Future    Hypothyroidism, unspecified type          Please note that this dictation was created using voice recognition software. I have made every reasonable attempt to correct obvious errors, but I expect that there are errors of grammar and possibly content that I did not discover before finalizing the note.

## 2018-10-09 NOTE — PATIENT INSTRUCTIONS
Referral to sports medicine    MRI of thoracic spine     take thyroid meds consistently for next 4 weeks, then have labs done    Take B6 daily, 200 mg daily

## 2018-10-09 NOTE — ASSESSMENT & PLAN NOTE
This is chronic, uncontrolled. This has been going on for several months now. She thought she had an order for thoracic spine MRI, but I believe we discussed that this would be ordered if her pain did not improve with exercises, Celebrex, heat and stretches. Pain persists. She has not been taking the Celebrex because of the black box warning on label. She is taking muscle relaxer, but this does not help much. Did advise PT, she would like to wait until she has MRI.

## 2018-10-09 NOTE — ASSESSMENT & PLAN NOTE
This is a chronic condition, status of control unknown as she was to have labs done before today's visit.  She admits to me that she has not been taking medications consistently and did not want to have labs checked at this time because she knows they will be abnormally high.  She is currently on 100 mcg daily, her last TSH was over 12.  She was strongly advised to take his medications consistently and we will recheck labs again in 6-8 weeks.  She tolerates this well with no significant or bothersome side effects and she does understand to take this on an empty stomach first thing in the morning apart from her other medications.

## 2018-11-05 ENCOUNTER — HOSPITAL ENCOUNTER (OUTPATIENT)
Dept: RADIOLOGY | Facility: MEDICAL CENTER | Age: 48
End: 2018-11-05
Attending: NURSE PRACTITIONER
Payer: COMMERCIAL

## 2018-11-05 DIAGNOSIS — G89.29 CHRONIC UPPER BACK PAIN: ICD-10-CM

## 2018-11-05 DIAGNOSIS — M54.9 CHRONIC UPPER BACK PAIN: ICD-10-CM

## 2018-11-05 PROCEDURE — 72146 MRI CHEST SPINE W/O DYE: CPT

## 2018-11-12 ENCOUNTER — HOSPITAL ENCOUNTER (OUTPATIENT)
Dept: LAB | Facility: MEDICAL CENTER | Age: 48
End: 2018-11-12
Attending: NURSE PRACTITIONER
Payer: COMMERCIAL

## 2018-11-12 DIAGNOSIS — E03.9 HYPOTHYROIDISM, UNSPECIFIED TYPE: ICD-10-CM

## 2018-11-12 LAB
T4 FREE SERPL-MCNC: 0.83 NG/DL (ref 0.53–1.43)
TSH SERPL DL<=0.005 MIU/L-ACNC: 6.04 UIU/ML (ref 0.38–5.33)

## 2018-11-12 PROCEDURE — 36415 COLL VENOUS BLD VENIPUNCTURE: CPT

## 2018-11-12 PROCEDURE — 84443 ASSAY THYROID STIM HORMONE: CPT

## 2018-11-12 PROCEDURE — 84439 ASSAY OF FREE THYROXINE: CPT

## 2018-11-20 ENCOUNTER — OFFICE VISIT (OUTPATIENT)
Dept: MEDICAL GROUP | Facility: PHYSICIAN GROUP | Age: 48
End: 2018-11-20
Payer: COMMERCIAL

## 2018-11-20 VITALS
RESPIRATION RATE: 16 BRPM | HEART RATE: 50 BPM | HEIGHT: 63 IN | SYSTOLIC BLOOD PRESSURE: 98 MMHG | WEIGHT: 111 LBS | OXYGEN SATURATION: 97 % | BODY MASS INDEX: 19.67 KG/M2 | DIASTOLIC BLOOD PRESSURE: 62 MMHG | TEMPERATURE: 97.6 F

## 2018-11-20 DIAGNOSIS — E03.9 HYPOTHYROIDISM, UNSPECIFIED TYPE: ICD-10-CM

## 2018-11-20 DIAGNOSIS — M79.601 PAIN IN BOTH UPPER EXTREMITIES: ICD-10-CM

## 2018-11-20 DIAGNOSIS — M79.602 PAIN IN BOTH UPPER EXTREMITIES: ICD-10-CM

## 2018-11-20 DIAGNOSIS — M25.531 PAIN IN BOTH WRISTS: ICD-10-CM

## 2018-11-20 DIAGNOSIS — M54.9 CHRONIC UPPER BACK PAIN: ICD-10-CM

## 2018-11-20 DIAGNOSIS — G89.29 CHRONIC UPPER BACK PAIN: ICD-10-CM

## 2018-11-20 DIAGNOSIS — M25.532 PAIN IN BOTH WRISTS: ICD-10-CM

## 2018-11-20 DIAGNOSIS — Z12.39 SCREENING FOR BREAST CANCER: ICD-10-CM

## 2018-11-20 PROCEDURE — 99214 OFFICE O/P EST MOD 30 MIN: CPT | Performed by: NURSE PRACTITIONER

## 2018-11-20 RX ORDER — TIZANIDINE 4 MG/1
4 TABLET ORAL DAILY
Qty: 30 TAB | Refills: 2 | Status: SHIPPED | OUTPATIENT
Start: 2018-11-20

## 2018-11-20 RX ORDER — LEVOTHYROXINE SODIUM 0.12 MG/1
125 TABLET ORAL
Qty: 90 TAB | Refills: 1 | Status: SHIPPED | OUTPATIENT
Start: 2018-11-20

## 2018-11-20 RX ORDER — NABUMETONE 500 MG/1
500 TABLET, FILM COATED ORAL 2 TIMES DAILY
Qty: 60 TAB | Refills: 2 | Status: SHIPPED | OUTPATIENT
Start: 2018-11-20

## 2018-11-20 ASSESSMENT — PATIENT HEALTH QUESTIONNAIRE - PHQ9: CLINICAL INTERPRETATION OF PHQ2 SCORE: 0

## 2018-11-20 NOTE — ASSESSMENT & PLAN NOTE
This is a chronic condition, stable and improving since increasing dose of levothyroxine.  She is currently on 100 mcg daily and her most recent TSH was a bit over 6.  She does deny symptoms of hypothyroidism, will increase this to 125 mcg daily and repeat labs again in 6-8 weeks.  She tolerates it well with no significant or bothersome side effects and does understand that she is to take this on an empty stomach first thing in the morning apart from her other medications.

## 2018-11-20 NOTE — PROGRESS NOTES
Chief Complaint   Patient presents with   • Hypothyroidism     fv labs, MRI         This is a 48 y.o.female patient that presents today with the following: Follow-up visit, review labs, MRI    Chronic upper back pain  Patient continues to struggle with chronic upper back pain, this has been going on for several months.  She recently had thoracic spine MRI which was normal.  She did not start Celebrex due to cost but continues to use tizanidine 4 mg daily at bedtime.  At this time physical therapy is recommended, this has been ordered for her.  If her symptoms do not improve with new anti-inflammatory and continued use of muscle relaxer as well as physical therapy she will need referral to spine specialist.    Hypothyroid  This is a chronic condition, stable and improving since increasing dose of levothyroxine.  She is currently on 100 mcg daily and her most recent TSH was a bit over 6.  She does deny symptoms of hypothyroidism, will increase this to 125 mcg daily and repeat labs again in 6-8 weeks.  She tolerates it well with no significant or bothersome side effects and does understand that she is to take this on an empty stomach first thing in the morning apart from her other medications.      Hospital Outpatient Visit on 11/12/2018   Component Date Value   • TSH 11/12/2018 6.040*   • Free T-4 11/12/2018 0.83          clinical course has been stable    Past Medical History:   Diagnosis Date   • Left sided sciatica 3/3/2011   • Urinary frequency 8/20/2010       Past Surgical History:   Procedure Laterality Date   • TUBAL LIGATION         Family History   Problem Relation Age of Onset   • Lung Disease Mother    • GI Mother    • Non-contributory Father         health history is unknown   • Diabetes Brother        Patient has no known allergies.    Current Outpatient Prescriptions Ordered in [facility]   Medication Sig Dispense Refill   • levothyroxine (SYNTHROID) 125 MCG Tab Take 1 Tab by mouth Every morning on an empty  "stomach. 90 Tab 1   • tizanidine (ZANAFLEX) 4 MG Tab Take 1 Tab by mouth every day. 30 Tab 2   • nabumetone (RELAFEN) 500 MG Tab Take 1 Tab by mouth 2 times a day. 60 Tab 2     No current Epic-ordered facility-administered medications on file.        Constitutional ROS: No unexpected change in weight, No weakness, No unexplained fevers, sweats, or chills  Pulmonary ROS: No chronic cough, sputum, or hemoptysis, No shortness of breath, No recent change in breathing  Cardiovascular ROS: No chest pain, No edema, No palpitations  Musculoskeletal/Extremities ROS: Positive per HPI  Neurologic ROS: Normal development, No seizures, No weakness  Endocrine ROS: Positive per HPI    Physical exam:  BP (!) 98/62 (BP Location: Right arm, Patient Position: Sitting, BP Cuff Size: Adult)   Pulse (!) 50   Temp 36.4 °C (97.6 °F) (Temporal)   Resp 16   Ht 1.6 m (5' 3\")   Wt 50.3 kg (111 lb)   SpO2 97%   BMI 19.66 kg/m²   General Appearance: alert, no distress, well-nourished, well-groomed  Skin: Skin color, texture, turgor normal. No rashes or lesions.  Lungs: negative findings: normal respiratory rate and rhythm, lungs clear to auscultation  Heart: negative. RRR without murmur, gallop, or rubs.  No ectopy.  Abdomen: Abdomen soft, non-tender. BS normal. No masses,  No organomegaly  Musculoskeletal: negative findings: no evidence of joint instability, no evidence of muscle atrophy, no deformities present  Neurologic: intact    Medical decision making/discussion: Patient has been referred to physical therapy, will refill the tizanidine.  We will have her increase levothyroxine to 125 mcg daily repeat labs again in 6-8 weeks.  We will also add Relafen 500 mg twice daily to regimen.  Mammogram has been ordered and she is to follow-up anytime for Pap smear.    Bertha was seen today for hypothyroidism.    Diagnoses and all orders for this visit:    Hypothyroidism, unspecified type  -     levothyroxine (SYNTHROID) 125 MCG Tab; Take 1 Tab " by mouth Every morning on an empty stomach.  -     TSH WITH REFLEX TO FT4; Future    Pain in both upper extremities  -     tizanidine (ZANAFLEX) 4 MG Tab; Take 1 Tab by mouth every day.    Pain in both wrists  -     tizanidine (ZANAFLEX) 4 MG Tab; Take 1 Tab by mouth every day.    Chronic upper back pain  -     tizanidine (ZANAFLEX) 4 MG Tab; Take 1 Tab by mouth every day.  -     REFERRAL TO PHYSICAL THERAPY Reason for Therapy: Eval/Treat/Report  -     nabumetone (RELAFEN) 500 MG Tab; Take 1 Tab by mouth 2 times a day.    Screening for breast cancer  -     MA-SCREEN MAMMO W/CAD-BILAT; Future          Please note that this dictation was created using voice recognition software. I have made every reasonable attempt to correct obvious errors, but I expect that there are errors of grammar and possibly content that I did not discover before finalizing the note.

## 2018-11-20 NOTE — PATIENT INSTRUCTIONS
PT    Refilled muscle relaxer, called in new anti-inflammatory    Increased dose of levothyroxine    Repeat labs in 6-8 weeks

## 2018-11-20 NOTE — ASSESSMENT & PLAN NOTE
Patient continues to struggle with chronic upper back pain, this has been going on for several months.  She recently had thoracic spine MRI which was normal.  She did not start Celebrex due to cost but continues to use tizanidine 4 mg daily at bedtime.  At this time physical therapy is recommended, this has been ordered for her.  If her symptoms do not improve with new anti-inflammatory and continued use of muscle relaxer as well as physical therapy she will need referral to spine specialist.

## 2019-01-07 ENCOUNTER — HOSPITAL ENCOUNTER (OUTPATIENT)
Dept: RADIOLOGY | Facility: MEDICAL CENTER | Age: 49
End: 2019-01-07
Attending: NURSE PRACTITIONER
Payer: COMMERCIAL

## 2019-01-07 DIAGNOSIS — Z12.39 SCREENING FOR BREAST CANCER: ICD-10-CM

## 2019-01-07 PROCEDURE — 77067 SCR MAMMO BI INCL CAD: CPT
